# Patient Record
Sex: MALE | Race: WHITE | NOT HISPANIC OR LATINO | URBAN - METROPOLITAN AREA
[De-identification: names, ages, dates, MRNs, and addresses within clinical notes are randomized per-mention and may not be internally consistent; named-entity substitution may affect disease eponyms.]

---

## 2017-09-27 ENCOUNTER — IMPORTED ENCOUNTER (OUTPATIENT)
Dept: URBAN - METROPOLITAN AREA CLINIC 50 | Facility: CLINIC | Age: 67
End: 2017-09-27

## 2017-10-20 ENCOUNTER — IMPORTED ENCOUNTER (OUTPATIENT)
Dept: URBAN - METROPOLITAN AREA CLINIC 50 | Facility: CLINIC | Age: 67
End: 2017-10-20

## 2018-11-02 ENCOUNTER — IMPORTED ENCOUNTER (OUTPATIENT)
Dept: URBAN - METROPOLITAN AREA CLINIC 50 | Facility: CLINIC | Age: 68
End: 2018-11-02

## 2019-11-08 ENCOUNTER — IMPORTED ENCOUNTER (OUTPATIENT)
Dept: URBAN - METROPOLITAN AREA CLINIC 50 | Facility: CLINIC | Age: 69
End: 2019-11-08

## 2019-11-08 NOTE — PATIENT DISCUSSION
"""Discussed he qualifies for cataract surgery but he isn't too bothered by glare.  Will monitor. """

## 2019-12-16 NOTE — PATIENT DISCUSSION
EARLY CATARACT, OD: CONTINUE TO OBSERVE. RECOMMEND YEARLY GENERAL EXAMS AND REFRACTION WITH DR Román Flores.

## 2019-12-16 NOTE — PATIENT DISCUSSION
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  RETINAL DETACHMENT SIGNS AND SYMPTOMS REVIEWED

## 2020-01-21 NOTE — PATIENT DISCUSSION
WILL NOT GIVE GLASSES RX TODAY DUE TO INACCURATE RX OS FROM PCO OS. WILL RECHECK AFTER YAG OS AND THEN DISCUSS CAT SX OD.

## 2021-03-12 ENCOUNTER — IMPORTED ENCOUNTER (OUTPATIENT)
Dept: URBAN - METROPOLITAN AREA CLINIC 50 | Facility: CLINIC | Age: 71
End: 2021-03-12

## 2021-04-18 ASSESSMENT — VISUAL ACUITY
OS_OTHER: 20/25. 20/40.
OD_OTHER: 20/30. 20/40.
OD_OTHER: 20/25-. 20/40.
OS_CC: 20/20
OS_CC: 20/25-
OD_BAT: 20/25-
OS_OTHER: 20/30. 20/40.
OD_CC: J1+
OS_OTHER: 20/25. 20/40.
OD_CC: J1+@ 15 IN
OD_OTHER: 20/20. 20/25.
OS_CC: 20/20-1
OS_CC: J1+@ 16 IN
OS_BAT: 20/25
OD_CC: 20/20
OS_BAT: 20/30
OS_BAT: 20/25
OD_BAT: 20/30
OD_CC: 20/20
OS_CC: J1+@ 15 IN
OS_CC: J1+
OS_CC: 20/20
OS_CC: J1
OD_CC: 20/20
OD_BAT: 20/20
OD_CC: J1
OD_CC: 20/20
OD_CC: J1+@ 16 IN

## 2021-04-18 ASSESSMENT — TONOMETRY
OS_IOP_MMHG: 16
OS_IOP_MMHG: 16
OD_IOP_MMHG: 17
OD_IOP_MMHG: 17
OS_IOP_MMHG: 17
OS_IOP_MMHG: 18
OD_IOP_MMHG: 16
OD_IOP_MMHG: 16

## 2022-03-18 ENCOUNTER — ESTABLISHED PATIENT (OUTPATIENT)
Dept: URBAN - METROPOLITAN AREA CLINIC 52 | Facility: CLINIC | Age: 72
End: 2022-03-18

## 2022-03-18 DIAGNOSIS — H25.13: ICD-10-CM

## 2022-03-18 DIAGNOSIS — H35.372: ICD-10-CM

## 2022-03-18 DIAGNOSIS — E11.9: ICD-10-CM

## 2022-03-18 PROCEDURE — 92014 COMPRE OPH EXAM EST PT 1/>: CPT

## 2022-03-18 ASSESSMENT — VISUAL ACUITY
OD_GLARE: 20/25
OD_CC: 20/20
OS_GLARE: 20/40
OU_CC: 20/20
OS_GLARE: 20/40
OS_CC: 20/20-
OU_CC: J1+@ 14 IN
OD_GLARE: 20/30

## 2022-03-18 ASSESSMENT — TONOMETRY
OS_IOP_MMHG: 16
OD_IOP_MMHG: 17

## 2023-05-18 ENCOUNTER — COMPREHENSIVE EXAM (OUTPATIENT)
Dept: URBAN - METROPOLITAN AREA CLINIC 53 | Facility: CLINIC | Age: 73
End: 2023-05-18

## 2023-05-18 DIAGNOSIS — H35.372: ICD-10-CM

## 2023-05-18 DIAGNOSIS — E11.9: ICD-10-CM

## 2023-05-18 DIAGNOSIS — H25.13: ICD-10-CM

## 2023-05-18 PROCEDURE — 92134 CPTRZ OPH DX IMG PST SGM RTA: CPT

## 2023-05-18 PROCEDURE — 92014 COMPRE OPH EXAM EST PT 1/>: CPT

## 2023-05-18 ASSESSMENT — VISUAL ACUITY
OD_GLARE: 20/25
OD_CC: 20/25
OS_GLARE: 20/20
OS_GLARE: 20/20
OD_GLARE: 20/25
OU_CC: J1@16"
OS_CC: 20/25

## 2023-05-18 ASSESSMENT — TONOMETRY
OS_IOP_MMHG: 16
OD_IOP_MMHG: 18

## 2024-11-11 ENCOUNTER — COMPREHENSIVE EXAM (OUTPATIENT)
Dept: URBAN - METROPOLITAN AREA CLINIC 53 | Facility: CLINIC | Age: 74
End: 2024-11-11

## 2024-11-11 DIAGNOSIS — H25.13: ICD-10-CM

## 2024-11-11 DIAGNOSIS — H35.372: ICD-10-CM

## 2024-11-11 DIAGNOSIS — E11.9: ICD-10-CM

## 2024-11-11 PROCEDURE — 92134 CPTRZ OPH DX IMG PST SGM RTA: CPT

## 2024-11-11 PROCEDURE — 92015 DETERMINE REFRACTIVE STATE: CPT

## 2024-11-11 PROCEDURE — 99214 OFFICE O/P EST MOD 30 MIN: CPT
